# Patient Record
Sex: MALE | NOT HISPANIC OR LATINO | URBAN - METROPOLITAN AREA
[De-identification: names, ages, dates, MRNs, and addresses within clinical notes are randomized per-mention and may not be internally consistent; named-entity substitution may affect disease eponyms.]

---

## 2023-09-11 ENCOUNTER — OFFICE VISIT (OUTPATIENT)
Dept: URBAN - METROPOLITAN AREA SURGERY CENTER 31 | Facility: SURGERY CENTER | Age: 78
End: 2023-09-11

## 2023-09-14 ENCOUNTER — OUT OF OFFICE VISIT (OUTPATIENT)
Dept: URBAN - METROPOLITAN AREA SURGERY CENTER 31 | Facility: SURGERY CENTER | Age: 78
End: 2023-09-14
Payer: MEDICARE

## 2023-09-14 DIAGNOSIS — K57.30 DIVERTICULA OF COLON: ICD-10-CM

## 2023-09-14 DIAGNOSIS — Z12.11 COLON CANCER SCREENING: ICD-10-CM

## 2023-09-14 DIAGNOSIS — Q43.4 REDUNDANT COLON: ICD-10-CM

## 2023-09-14 DIAGNOSIS — Z12.11 COLON CANCER SCREENING (HIGH RISK): ICD-10-CM

## 2023-09-14 DIAGNOSIS — K64.8 HEMORRHOIDS, INTERNAL: ICD-10-CM

## 2023-09-14 DIAGNOSIS — Q43.8 CONGENITAL REDUNDANT COLON: ICD-10-CM

## 2023-09-14 PROCEDURE — G8907 PT DOC NO EVENTS ON DISCHARG: HCPCS | Performed by: INTERNAL MEDICINE

## 2023-09-14 PROCEDURE — 00811 ANES LWR INTST NDSC NOS: CPT | Performed by: NURSE ANESTHETIST, CERTIFIED REGISTERED

## 2023-09-14 PROCEDURE — G0121 COLON CA SCRN NOT HI RSK IND: HCPCS | Performed by: INTERNAL MEDICINE

## 2023-09-15 ENCOUNTER — OUT OF OFFICE VISIT (OUTPATIENT)
Dept: URBAN - METROPOLITAN AREA SURGERY CENTER 31 | Facility: SURGERY CENTER | Age: 78
End: 2023-09-15

## 2024-07-12 ENCOUNTER — TELEPHONE ENCOUNTER (OUTPATIENT)
Dept: URBAN - METROPOLITAN AREA CLINIC 128 | Facility: CLINIC | Age: 79
End: 2024-07-12

## 2024-07-24 ENCOUNTER — DASHBOARD ENCOUNTERS (OUTPATIENT)
Age: 79
End: 2024-07-24

## 2024-07-24 ENCOUNTER — LAB OUTSIDE AN ENCOUNTER (OUTPATIENT)
Dept: URBAN - METROPOLITAN AREA CLINIC 128 | Facility: CLINIC | Age: 79
End: 2024-07-24

## 2024-07-24 ENCOUNTER — OFFICE VISIT (OUTPATIENT)
Dept: URBAN - METROPOLITAN AREA CLINIC 128 | Facility: CLINIC | Age: 79
End: 2024-07-24
Payer: MEDICARE

## 2024-07-24 VITALS
DIASTOLIC BLOOD PRESSURE: 72 MMHG | TEMPERATURE: 97.5 F | SYSTOLIC BLOOD PRESSURE: 108 MMHG | BODY MASS INDEX: 31.07 KG/M2 | HEART RATE: 49 BPM | WEIGHT: 229.4 LBS | HEIGHT: 72 IN

## 2024-07-24 DIAGNOSIS — K82.8 GALLBLADDER MASS: ICD-10-CM

## 2024-07-24 DIAGNOSIS — K76.9 LIVER LESION: ICD-10-CM

## 2024-07-24 DIAGNOSIS — K80.20 ASYMPTOMATIC CHOLELITHIASIS: ICD-10-CM

## 2024-07-24 PROBLEM — 300331000: Status: ACTIVE | Noted: 2024-07-24

## 2024-07-24 PROBLEM — 266474003: Status: ACTIVE | Noted: 2024-07-24

## 2024-07-24 PROCEDURE — 99214 OFFICE O/P EST MOD 30 MIN: CPT | Performed by: PHYSICIAN ASSISTANT

## 2024-07-24 RX ORDER — ASPIRIN 81 MG/1
1 TABLET TABLET, COATED ORAL ONCE A DAY
Status: ACTIVE | COMMUNITY
Start: 2024-07-24

## 2024-07-24 NOTE — HPI-TODAY'S VISIT:
Patient referred by Dr Cynthia Mijares due to incidental finding of a mass lesion in gallbladder fossa. He was applying for life insurance and had CT chest that showed a gallbladder mass.  He denies any GI issues, no acid reflux, no nausea, no weight loss, eating well, normal BMs. No family history of any type of cancer. He lives a very active life, still working, no medical problems just mild HTN and HLD on lipitor.  His last colonoscopy 9/2023 was screening: mild diverticulosis of sigmoid colon, redundant colon.  CT Chest:   Couple of low density lesions in hepatic segment 2 measuring up to 1 cm, simple fluid density where measurable. A mass lesion in the peripheral gallbladder fossa  probably represents a gallbladder phrygian cap distended with gallstones, with noncalcified soft tissue attenuating elements possible sludge, mass not excluded.

## 2024-07-26 LAB
AFP, SERUM, TUMOR MARKER: 2.3
ALBUMIN/GLOBULIN RATIO: 2.3
ALBUMIN: 4.3
ALKALINE PHOSPHATASE: 48
ALT (SGPT): 19
AST (SGOT): 24
BILIRUBIN, DIRECT: 0.2
BILIRUBIN, INDIRECT: 0.6
BILIRUBIN, TOTAL: 0.8
CA 19-9: 22
CEA: 2
GLOBULIN: 1.9
PROTEIN, TOTAL: 6.2

## 2024-08-07 ENCOUNTER — TELEPHONE ENCOUNTER (OUTPATIENT)
Dept: URBAN - METROPOLITAN AREA CLINIC 128 | Facility: CLINIC | Age: 79
End: 2024-08-07

## 2024-08-07 ENCOUNTER — OFFICE VISIT (OUTPATIENT)
Dept: URBAN - METROPOLITAN AREA CLINIC 128 | Facility: CLINIC | Age: 79
End: 2024-08-07

## 2024-08-09 ENCOUNTER — LAB OUTSIDE AN ENCOUNTER (OUTPATIENT)
Dept: URBAN - METROPOLITAN AREA CLINIC 128 | Facility: CLINIC | Age: 79
End: 2024-08-09

## 2024-08-14 ENCOUNTER — OFFICE VISIT (OUTPATIENT)
Dept: URBAN - METROPOLITAN AREA CLINIC 128 | Facility: CLINIC | Age: 79
End: 2024-08-14
Payer: MEDICARE

## 2024-08-14 VITALS
DIASTOLIC BLOOD PRESSURE: 80 MMHG | OXYGEN SATURATION: 97 % | HEIGHT: 72 IN | BODY MASS INDEX: 31.04 KG/M2 | HEART RATE: 52 BPM | SYSTOLIC BLOOD PRESSURE: 120 MMHG | WEIGHT: 229.2 LBS | TEMPERATURE: 97.4 F

## 2024-08-14 DIAGNOSIS — K80.20 ASYMPTOMATIC GALLSTONES: ICD-10-CM

## 2024-08-14 DIAGNOSIS — K82.8 GALLBLADDER MASS: ICD-10-CM

## 2024-08-14 PROBLEM — 266474003: Status: ACTIVE | Noted: 2024-08-14

## 2024-08-14 PROCEDURE — 99213 OFFICE O/P EST LOW 20 MIN: CPT | Performed by: PHYSICIAN ASSISTANT

## 2024-08-14 RX ORDER — ASPIRIN 81 MG/1
1 TABLET TABLET, COATED ORAL ONCE A DAY
Status: ACTIVE | COMMUNITY
Start: 2024-07-24

## 2024-08-14 NOTE — HPI-TODAY'S VISIT:
Tori here for follow up for possible gallbladder mass. He had MRI/MRCP that showed:  No definite evidence of gallbladder malignancy. The appearance on prior CT is likely related to a large tract stone within the gallbladder fundus which has some heterogeneous somewhat atypical signal intensity.  Patient denies any abdominal pain, no nausea, no vomiting, no change in appetite or weight.  he had normal liver enzymes and negative CEA, AFP,